# Patient Record
Sex: FEMALE | Race: BLACK OR AFRICAN AMERICAN | NOT HISPANIC OR LATINO | Employment: FULL TIME | URBAN - METROPOLITAN AREA
[De-identification: names, ages, dates, MRNs, and addresses within clinical notes are randomized per-mention and may not be internally consistent; named-entity substitution may affect disease eponyms.]

---

## 2022-12-09 ENCOUNTER — OFFICE VISIT (OUTPATIENT)
Dept: URGENT CARE | Facility: CLINIC | Age: 42
End: 2022-12-09
Payer: COMMERCIAL

## 2022-12-09 VITALS
HEIGHT: 67 IN | TEMPERATURE: 98 F | HEART RATE: 82 BPM | BODY MASS INDEX: 33.59 KG/M2 | DIASTOLIC BLOOD PRESSURE: 100 MMHG | SYSTOLIC BLOOD PRESSURE: 144 MMHG | RESPIRATION RATE: 16 BRPM | WEIGHT: 214 LBS | OXYGEN SATURATION: 100 %

## 2022-12-09 DIAGNOSIS — R03.0 ELEVATED BLOOD PRESSURE READING IN OFFICE WITHOUT DIAGNOSIS OF HYPERTENSION: ICD-10-CM

## 2022-12-09 DIAGNOSIS — M94.0 COSTOCHONDRITIS, ACUTE: ICD-10-CM

## 2022-12-09 DIAGNOSIS — S29.019A THORACIC MYOFASCIAL STRAIN, INITIAL ENCOUNTER: ICD-10-CM

## 2022-12-09 DIAGNOSIS — M54.6 ACUTE RIGHT-SIDED THORACIC BACK PAIN: ICD-10-CM

## 2022-12-09 DIAGNOSIS — R07.9 CHEST PAIN, UNSPECIFIED TYPE: Primary | ICD-10-CM

## 2022-12-09 PROCEDURE — 93005 EKG 12-LEAD: ICD-10-PCS | Mod: S$GLB,,, | Performed by: PHYSICIAN ASSISTANT

## 2022-12-09 PROCEDURE — 93010 ELECTROCARDIOGRAM REPORT: CPT | Mod: S$GLB,,, | Performed by: INTERNAL MEDICINE

## 2022-12-09 PROCEDURE — 93005 ELECTROCARDIOGRAM TRACING: CPT | Mod: S$GLB,,, | Performed by: PHYSICIAN ASSISTANT

## 2022-12-09 PROCEDURE — 71046 XR CHEST PA AND LATERAL: ICD-10-PCS | Mod: S$GLB,,, | Performed by: RADIOLOGY

## 2022-12-09 PROCEDURE — 93010 EKG 12-LEAD: ICD-10-PCS | Mod: S$GLB,,, | Performed by: INTERNAL MEDICINE

## 2022-12-09 PROCEDURE — 71046 X-RAY EXAM CHEST 2 VIEWS: CPT | Mod: S$GLB,,, | Performed by: RADIOLOGY

## 2022-12-09 PROCEDURE — 99204 OFFICE O/P NEW MOD 45 MIN: CPT | Mod: S$GLB,,, | Performed by: PHYSICIAN ASSISTANT

## 2022-12-09 PROCEDURE — 99204 PR OFFICE/OUTPT VISIT, NEW, LEVL IV, 45-59 MIN: ICD-10-PCS | Mod: S$GLB,,, | Performed by: PHYSICIAN ASSISTANT

## 2022-12-10 NOTE — PATIENT INSTRUCTIONS
- Rest.    - Drink plenty of fluids.    - Acetaminophen (tylenol) or Ibuprofen (advil,motrin) as directed as needed for fever/pain. Avoid tylenol if you have a history of liver disease. Do not take ibuprofen if you have a history of GI bleeding, kidney disease, or if you take blood thinners.     - Follow up with your PCP or specialty clinic as directed in the next 2-3 if not improved or as needed.  You can call [phone] to schedule an appointment with the appropriate provider.    - Go to the ER or seek medical attention immediately if you develop new or worsening symptoms.     - You must understand that you have received an Urgent Care treatment only and that you may be released before all of your medical problems are known or treated.   - You, the patient, will arrange for follow up care as instructed.   - If your condition worsens or fails to improve we recommend that you receive another evaluation at the ER immediately or contact your PCP to discuss your concerns or return here.    Elevated Blood Pressure  Your blood pressure was elevated during your visit to the urgent care.  It was not so high that immediate care was needed but it is recommended that you monitor your blood pressure over the next week or two to make sure that it is not staying elevated.  Please have your blood pressure taken 2-3 times daily at different times of the day.  Write all of those blood pressures down and record the time that they were taken.  Keep all that information and take it with you to see your Primary Care Physician.  If your blood pressure is consistently above 140/90 you will need to follow up with your PCP more quickly         Doxepin Pregnancy And Lactation Text: This medication is Pregnancy Category C and it isn't known if it is safe during pregnancy. It is also excreted in breast milk and breast feeding isn't recommended.

## 2022-12-10 NOTE — PROGRESS NOTES
"Subjective:       Patient ID: Corinne Vanegas is a 42 y.o. female.    Vitals:  height is 5' 7" (1.702 m) and weight is 97.1 kg (214 lb). Her temperature is 98.3 °F (36.8 °C). Her blood pressure is 144/100 (abnormal) and her pulse is 82. Her respiration is 16 and oxygen saturation is 100%.     Chief Complaint: Chest Pain    Pt is a 42 year old healthy female presents who presents today for evaluation of back and chest pain that began this morning..  Patient states that when she woke up this morning she sat up out of bed and initially noticed the right mid to upper back pain, she states that she presumed it was from the mattress, as she is in town for a conference. No injury or trauma.  Patient states that soon after that, she bent over while sitting down and she immediately felt the right-sided anterior chest pain.  Patient states that right-sided anterior chest pain and back pain has been constant since onset, described as dull discomfort, and will sometimes cause sharp pains.  Sharp pains are provoked by quick movements and coughing.  Patient states that for the past few months she has had intermittent coughing and URI symptoms along with her young child.  No fever. No pleuritic pain or abdominal pain.  No dyspnea.  No LE edema/calf pain/calf swelling.  She came in town yesterday for the conference, had 2 flight from [place].  Patient states that she has no medical conditions.  No history of hypertension, diabetes, hyperlipidemia.  No history of thrombosis.  Nonsmoker.  No recent hospitalization, plaster immobilization, cancer/chemo, surgery. No hemoptysis. She does note that earlier today prior to coming to clinic, she was on the phone talking to her  and began to feel dizzy, this lasted for about 3 minutes and resolved, no associated sx; has not returned.  Sees PCP regularly, last visit was 6 months ago and thinks were normal, though blood pressure was more elevated than usual at that time, but labs " were normal.    Chest Pain   This is a new problem. The current episode started today. The onset quality is sudden. The problem occurs constantly. The problem has been unchanged. The pain is at a severity of 7/10. The quality of the pain is described as heavy, pressure, sharp and stabbing. Associated symptoms include back pain, a cough and dizziness (resolved). Pertinent negatives include no abdominal pain, diaphoresis, fever, headaches, hemoptysis, nausea, numbness, palpitations, shortness of breath, sputum production, syncope, vomiting or weakness. The pain is aggravated by movement and coughing (not exertional, rather provoked with rom trunk, quick movements, coughing). She has tried nothing for the symptoms.   Pertinent negatives for past medical history include no COPD and no seizures.   Her family medical history is significant for diabetes, heart disease, hyperlipidemia and hypertension.     Constitution: Negative for chills, sweating, fatigue and fever.   HENT:  Negative for congestion, sinus pain and sore throat.    Neck: Negative for neck pain and neck stiffness.   Cardiovascular:  Positive for chest pain. Negative for chest trauma, leg swelling, palpitations, sob on exertion and passing out.   Eyes:  Negative for eye itching, eye pain and eye redness.   Respiratory:  Positive for cough. Negative for chest tightness, sputum production, bloody sputum, COPD, shortness of breath, stridor, wheezing and asthma.    Gastrointestinal:  Negative for abdominal pain, nausea, vomiting and diarrhea.   Genitourinary:  Negative for dysuria, frequency, urgency, flank pain and hematuria.   Musculoskeletal:  Positive for back pain. Negative for trauma and joint pain.   Skin:  Negative for color change and rash.   Allergic/Immunologic: Negative for asthma.   Neurological:  Positive for dizziness (resolved). Negative for history of vertigo, light-headedness, passing out, facial drooping, speech difficulty, coordination  disturbances, loss of balance, headaches, history of migraines, disorientation, altered mental status, loss of consciousness, numbness, tingling, seizures and tremors.   Psychiatric/Behavioral:  Negative for altered mental status and disorientation.      Objective:      Physical Exam   Constitutional: She is oriented to person, place, and time. She appears well-developed. She is cooperative.  Non-toxic appearance. She does not appear ill. No distress.      Comments:Very pleasant sitting comfortably in nad.      HENT:   Head: Normocephalic and atraumatic.   Ears:   Right Ear: Hearing and external ear normal.   Left Ear: Hearing and external ear normal.   Eyes: Conjunctivae and lids are normal. Right eye exhibits no discharge. Left eye exhibits no discharge. No scleral icterus.   Neck: Trachea normal and phonation normal. Neck supple.   Cardiovascular: Normal rate, regular rhythm, normal heart sounds and normal pulses.      Comments: No LE edema, swelling, erythema, or warmth. No pain or ttp of calf or popliteal area. Negative ishan bilat.    Pulmonary/Chest: Effort normal and breath sounds normal. No accessory muscle usage or stridor. No tachypnea and no bradypnea. No respiratory distress. She has no decreased breath sounds. She has no wheezes. She has no rhonchi. She has no rales.         She exhibits tenderness.       Abdominal: Normal appearance and bowel sounds are normal. She exhibits no distension and no mass. Soft. There is no abdominal tenderness. There is no guarding and negative Westbrook's sign.      Comments: Abdomen soft, no ttp.    Musculoskeletal: Normal range of motion.         General: No deformity. Normal range of motion.      Right lower leg: No edema.      Left lower leg: No edema.   Neurological: no focal deficit. She is alert and oriented to person, place, and time. She displays no weakness, no atrophy and no tremor. She exhibits normal muscle tone. She displays no seizure activity. Coordination  "and gait normal. GCS eye subscore is 4. GCS verbal subscore is 5. GCS motor subscore is 6.   Skin: Skin is warm, dry, intact, not diaphoretic and not pale.   Psychiatric: Her speech is normal and behavior is normal. Judgment and thought content normal.   Nursing note and vitals reviewed.          The EKG shows a normal, regular Sinus Rhythm with sinus arrhythmia, at a rate of 69, there are not ST Changes. Possible left atrial enlargement. There is not a previous EKG for comparison.     Vitals:    12/09/22 1841 12/09/22 1906 12/09/22 1907 12/09/22 1910   BP: (!) 144/96 (!) 137/93 (!) 144/98 (!) 144/100   Patient Position:  Lying Standing Standing   Pulse: 82   82   Resp: 16      Temp: 98.3 °F (36.8 °C)      SpO2: 100%      Weight: 97.1 kg (214 lb)      Height: 5' 7" (1.702 m)          XR CHEST PA AND LATERAL    Result Date: 12/9/2022  EXAMINATION: XR CHEST PA AND LATERAL CLINICAL HISTORY: Chest pain, unspecified TECHNIQUE: PA and lateral views of the chest were performed. COMPARISON: None FINDINGS: The cardiomediastinal silhouette is not enlarged.  There is no pleural effusion.  The trachea is midline.  The lungs are symmetrically expanded bilaterally without evidence of acute parenchymal process. No large focal consolidation seen.  There is no pneumothorax.  The osseous structures are remarkable for dextroscoliotic curvature of the spine..     1. No convincing acute cardiopulmonary process noting shallow inspiratory effort and accentuation of the interstitium related to habitus. Electronically signed by: Emil Espinoza MD Date:    12/09/2022 Time:    20:05       Assessment:       1. Chest pain, unspecified type    2. Acute right-sided thoracic back pain    3. Costochondritis, acute    4. Thoracic myofascial strain, initial encounter    5. Elevated blood pressure reading in office without diagnosis of hypertension          Plan:         EKG without signs of acute ischemia.  Patient overall low risk.  Vitals " today-she has elevated blood pressure, rest of vitals are within normal limits.  No clinical symptoms of DVT on exam.  Wells score for PE-0, low risk  Pt has reproducible chest and back pain, seems likely to be  MSK in nature based on exam and history.   Have considered costochondritis, chest wall strain, ACS, PE, pnx, pna, pleural effusion, aortic dissection, pericarditis, gerd, among others in ddx.   Discussed with pt that I cannot rule out all potential causes of cp in urgent care setting and should immediately to go ER for full further eval for new or worsening symptoms or persistent symptoms. Otherwise, follow up with pcp when she returns home.   - Discussed ddx, home care, tx options, and given follow up/ER precautions.     Chest pain, unspecified type  -     IN OFFICE EKG 12-LEAD (to [place])  -     XR CHEST PA AND LATERAL; Future; Expected date: 12/09/2022  -     Orthostatic vital signs    Acute right-sided thoracic back pain    Costochondritis, acute    Thoracic myofascial strain, initial encounter    Elevated blood pressure reading in office without diagnosis of hypertension       Patient Instructions   - Rest.    - Drink plenty of fluids.    - Acetaminophen (tylenol) or Ibuprofen (advil,motrin) as directed as needed for fever/pain. Avoid tylenol if you have a history of liver disease. Do not take ibuprofen if you have a history of GI bleeding, kidney disease, or if you take blood thinners.     - Follow up with your PCP or specialty clinic as directed in the next 2-3 if not improved or as needed.  You can call [phone] to schedule an appointment with the appropriate provider.    - Go to the ER or seek medical attention immediately if you develop new or worsening symptoms.     - You must understand that you have received an Urgent Care treatment only and that you may be released before all of your medical problems are known or treated.   - You, the patient, will arrange for follow up care as instructed.    - If your condition worsens or fails to improve we recommend that you receive another evaluation at the ER immediately or contact your PCP to discuss your concerns or return here.    Elevated Blood Pressure  Your blood pressure was elevated during your visit to the urgent care.  It was not so high that immediate care was needed but it is recommended that you monitor your blood pressure over the next week or two to make sure that it is not staying elevated.  Please have your blood pressure taken 2-3 times daily at different times of the day.  Write all of those blood pressures down and record the time that they were taken.  Keep all that information and take it with you to see your Primary Care Physician.  If your blood pressure is consistently above 140/90 you will need to follow up with your PCP more quickly           Additional MDM:     Heart Failure Score:   COPD = No